# Patient Record
Sex: MALE | Race: OTHER | Employment: STUDENT | URBAN - METROPOLITAN AREA
[De-identification: names, ages, dates, MRNs, and addresses within clinical notes are randomized per-mention and may not be internally consistent; named-entity substitution may affect disease eponyms.]

---
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
## 2025-01-09 ENCOUNTER — HOSPITAL ENCOUNTER (OUTPATIENT)
Dept: RADIOLOGY | Facility: HOSPITAL | Age: 20
Discharge: HOME | End: 2025-01-09
Payer: COMMERCIAL

## 2025-01-09 ENCOUNTER — APPOINTMENT (OUTPATIENT)
Dept: ORTHOPEDIC SURGERY | Facility: CLINIC | Age: 20
End: 2025-01-09
Payer: COMMERCIAL

## 2025-01-09 VITALS — HEIGHT: 66 IN | BODY MASS INDEX: 22.5 KG/M2 | WEIGHT: 140 LBS

## 2025-01-09 DIAGNOSIS — D16.22 OSTEOCHONDROMA OF FEMUR, LEFT: Primary | ICD-10-CM

## 2025-01-09 DIAGNOSIS — M25.562 LEFT KNEE PAIN, UNSPECIFIED CHRONICITY: ICD-10-CM

## 2025-01-09 PROCEDURE — 73564 X-RAY EXAM KNEE 4 OR MORE: CPT | Mod: LT

## 2025-01-09 PROCEDURE — 99203 OFFICE O/P NEW LOW 30 MIN: CPT | Performed by: STUDENT IN AN ORGANIZED HEALTH CARE EDUCATION/TRAINING PROGRAM

## 2025-01-09 PROCEDURE — 3008F BODY MASS INDEX DOCD: CPT | Performed by: STUDENT IN AN ORGANIZED HEALTH CARE EDUCATION/TRAINING PROGRAM

## 2025-01-09 RX ORDER — IBUPROFEN 800 MG/1
1 TABLET ORAL 2 TIMES DAILY PRN
COMMUNITY
Start: 2024-11-05

## 2025-01-09 RX ORDER — SERTRALINE HYDROCHLORIDE 50 MG/1
50 TABLET, FILM COATED ORAL DAILY
COMMUNITY

## 2025-01-09 NOTE — PROGRESS NOTES
Chief Complaint   Patient presents with    Left Knee - Pain     Xray's today       HPI  19-year-old male presents today for evaluation of his left knee.  Endorses sharp lateral knee pain clicking catching popping has been present for quite some time also endorses a enlarging mass about the anterior lateral aspect of his knee there this pain seems to be emanating from.  No history of trauma.  Notices that he has pain particular worsening when going up and down stairs.  Presents today for orthopedic evaluation treatment    Past Medical History:   Diagnosis Date    Personal history of other diseases of the respiratory system     Personal history of asthma       Past Surgical History:   Procedure Laterality Date    CIRCUMCISION, PRIMARY  04/24/2014    Elective Circumcision        No Known Allergies     Physical exam    General: Alert and oriented to place, person, and time.  No acute distress and breathing comfortably; pleasant and cooperative with the examination.  HEENT: Head is normocephalic and atraumatic.  Neck: Supple, no visible swelling.  Cardiovascular: Good perfusion to the affected extremity.  Lungs: No audible wheezing or labored breathing.  Abdomen: Nondistended  HEME/Lymph : No visible abnormalities bilateral lower extremity    Extremity:  Left knee:  Skin healthy and intact  Swelling about the anterolateral aspect the knee palpable nodule about the anterolateral aspect of the knee this is firm fixed mild tenderness outpatient with some mechanical crepitus with range of motion the knee  No significant varus or valgus malalignment  Effusion: Mild     ROM:  Full flexion   Full extension  No pain with internal rotation of the hip  Tenderness to palpation: Medial and lateral joint line     No laxity to valgus stress  No laxity to varus stress  Negative Lachman´s test  Negative anterior drawer test  Negative posterior drawer test  Positive Azalia´s test     Neurovascular exam normal  distally    Diagnostics:  X-rays collected in clinic today about the left knee: There is a mass about the distal femur consistent with an osteochondroma about the lateral metaphysis.  No aggressive appearance.  Maintained joint space within the knee articulation    Procedure:  Procedures    Assessment:  19-year-old male with osteochondroma left knee    Treatment plan:  The natural history of the condition and its associated treatment alternatives including surgical and nonsurgical options were discussed with the patient at length.  Given enlarging mass and mechanical type symptoms I do recommend proceed with an MRI of the left knee  The history and clinical exam are consistent with intraarticular pathology and therefore MRI is medically indicated to evaluate the soft tissues of the joint. Follow up in one week or after completion of the MRI to advance management accordingly  The patient understands and agrees with the plan.  Discussed activities to avoid as well as importance of using pain as a guide  All of the patient's questions were answered.

## 2025-07-14 ENCOUNTER — HOSPITAL ENCOUNTER (INPATIENT)
Age: 20
LOS: 4 days | Discharge: HOME | DRG: 751 | End: 2025-07-18
Attending: PSYCHIATRY & NEUROLOGY | Admitting: PSYCHIATRY & NEUROLOGY
Payer: COMMERCIAL

## 2025-07-14 DIAGNOSIS — F33.2 SEVERE EPISODE OF RECURRENT MAJOR DEPRESSIVE DISORDER, WITHOUT PSYCHOTIC FEATURES (HCC): Primary | ICD-10-CM

## 2025-07-14 DIAGNOSIS — F32.89 OTHER DEPRESSION: ICD-10-CM

## 2025-07-14 DIAGNOSIS — R45.851 SUICIDAL IDEATION: ICD-10-CM

## 2025-07-14 PROBLEM — F32.A DEPRESSION WITH SUICIDAL IDEATION: Status: ACTIVE | Noted: 2025-07-14

## 2025-07-14 LAB
ALBUMIN SERPL-MCNC: 5 G/DL (ref 3.5–4.6)
ALP SERPL-CCNC: 55 U/L (ref 35–104)
ALT SERPL-CCNC: 11 U/L (ref 0–41)
AMPHET UR QL SCN: ABNORMAL
ANION GAP SERPL CALCULATED.3IONS-SCNC: 13 MEQ/L (ref 9–15)
APAP SERPL-MCNC: <5 UG/ML (ref 10–30)
AST SERPL-CCNC: 16 U/L (ref 0–40)
BACTERIA URNS QL MICRO: NEGATIVE /HPF
BARBITURATES UR QL SCN: ABNORMAL
BASOPHILS # BLD: 0 K/UL (ref 0–0.2)
BASOPHILS NFR BLD: 0.4 %
BENZODIAZ UR QL SCN: ABNORMAL
BILIRUB SERPL-MCNC: 1 MG/DL (ref 0.2–0.7)
BILIRUB UR QL STRIP: NEGATIVE
BUN SERPL-MCNC: 10 MG/DL (ref 6–20)
CALCIUM SERPL-MCNC: 9.1 MG/DL (ref 8.5–9.9)
CANNABINOIDS UR QL SCN: POSITIVE
CHLORIDE SERPL-SCNC: 105 MEQ/L (ref 95–107)
CHOLEST SERPL-MCNC: 174 MG/DL (ref 0–199)
CK SERPL-CCNC: 126 U/L (ref 0–190)
CLARITY UR: CLEAR
CO2 SERPL-SCNC: 26 MEQ/L (ref 20–31)
COCAINE UR QL SCN: ABNORMAL
COLOR UR: YELLOW
CREAT SERPL-MCNC: 0.75 MG/DL (ref 0.7–1.2)
DRUG SCREEN COMMENT UR-IMP: ABNORMAL
EKG ATRIAL RATE: 68 BPM
EKG DIAGNOSIS: NORMAL
EKG P AXIS: 62 DEGREES
EKG P-R INTERVAL: 148 MS
EKG Q-T INTERVAL: 372 MS
EKG QRS DURATION: 96 MS
EKG QTC CALCULATION (BAZETT): 395 MS
EKG R AXIS: 80 DEGREES
EKG T AXIS: 42 DEGREES
EKG VENTRICULAR RATE: 68 BPM
EOSINOPHIL # BLD: 0.1 K/UL (ref 0–0.7)
EOSINOPHIL NFR BLD: 0.9 %
EPI CELLS #/AREA URNS AUTO: ABNORMAL /HPF (ref 0–5)
ERYTHROCYTE [DISTWIDTH] IN BLOOD BY AUTOMATED COUNT: 11.5 % (ref 11.5–14.5)
ESTIMATED AVERAGE GLUCOSE: 85 MG/DL
ETHANOL PERCENT: 0.03 G/DL
ETHANOLAMINE SERPL-MCNC: 29 MG/DL (ref 0–0.08)
FENTANYL SCREEN, URINE: ABNORMAL
GLOBULIN SER CALC-MCNC: 2.5 G/DL (ref 2.3–3.5)
GLUCOSE SERPL-MCNC: 103 MG/DL (ref 70–99)
GLUCOSE UR STRIP-MCNC: NEGATIVE MG/DL
HBA1C MFR BLD: 4.6 % (ref 4–6)
HCT VFR BLD AUTO: 45.3 % (ref 42–52)
HDLC SERPL-MCNC: 66 MG/DL (ref 40–59)
HGB BLD-MCNC: 16.1 G/DL (ref 14–18)
HGB UR QL STRIP: NEGATIVE
HYALINE CASTS #/AREA URNS AUTO: ABNORMAL /HPF (ref 0–5)
KETONES UR STRIP-MCNC: ABNORMAL MG/DL
LDLC SERPL CALC-MCNC: 89 MG/DL (ref 0–129)
LEUKOCYTE ESTERASE UR QL STRIP: ABNORMAL
LYMPHOCYTES # BLD: 1.6 K/UL (ref 1–4.8)
LYMPHOCYTES NFR BLD: 28.7 %
MAGNESIUM SERPL-MCNC: 2.2 MG/DL (ref 1.7–2.4)
MCH RBC QN AUTO: 32.7 PG (ref 27–31.3)
MCHC RBC AUTO-ENTMCNC: 35.5 % (ref 33–37)
MCV RBC AUTO: 92.1 FL (ref 79–92.2)
METHADONE UR QL SCN: ABNORMAL
MONOCYTES # BLD: 0.5 K/UL (ref 0.2–0.8)
MONOCYTES NFR BLD: 8.2 %
NEUTROPHILS # BLD: 3.4 K/UL (ref 1.4–6.5)
NEUTS SEG NFR BLD: 61.3 %
NITRITE UR QL STRIP: NEGATIVE
OPIATES UR QL SCN: ABNORMAL
OXYCODONE UR QL SCN: ABNORMAL
PCP UR QL SCN: ABNORMAL
PH UR STRIP: 6 [PH] (ref 5–9)
PLATELET # BLD AUTO: 164 K/UL (ref 130–400)
POTASSIUM SERPL-SCNC: 4.2 MEQ/L (ref 3.4–4.9)
PROPOXYPH UR QL SCN: ABNORMAL
PROT SERPL-MCNC: 7.5 G/DL (ref 6.3–8)
PROT UR STRIP-MCNC: NEGATIVE MG/DL
RBC # BLD AUTO: 4.92 M/UL (ref 4.7–6.1)
RBC #/AREA URNS AUTO: ABNORMAL /HPF (ref 0–5)
SALICYLATES SERPL-MCNC: <0.3 MG/DL (ref 15–30)
SODIUM SERPL-SCNC: 144 MEQ/L (ref 135–144)
SP GR UR STRIP: 1.01 (ref 1–1.03)
TRIGL SERPL-MCNC: 97 MG/DL (ref 0–150)
TSH REFLEX: 3.29 UIU/ML (ref 0.44–3.86)
URINE REFLEX TO CULTURE: YES
UROBILINOGEN UR STRIP-ACNC: 1 E.U./DL
WBC # BLD AUTO: 5.6 K/UL (ref 4.5–11)
WBC #/AREA URNS AUTO: ABNORMAL /HPF (ref 0–5)

## 2025-07-14 PROCEDURE — 80053 COMPREHEN METABOLIC PANEL: CPT

## 2025-07-14 PROCEDURE — 82077 ASSAY SPEC XCP UR&BREATH IA: CPT

## 2025-07-14 PROCEDURE — 80061 LIPID PANEL: CPT

## 2025-07-14 PROCEDURE — 93005 ELECTROCARDIOGRAM TRACING: CPT

## 2025-07-14 PROCEDURE — 83735 ASSAY OF MAGNESIUM: CPT

## 2025-07-14 PROCEDURE — 82550 ASSAY OF CK (CPK): CPT

## 2025-07-14 PROCEDURE — 99285 EMERGENCY DEPT VISIT HI MDM: CPT

## 2025-07-14 PROCEDURE — 93010 ELECTROCARDIOGRAM REPORT: CPT | Performed by: INTERNAL MEDICINE

## 2025-07-14 PROCEDURE — 80307 DRUG TEST PRSMV CHEM ANLYZR: CPT

## 2025-07-14 PROCEDURE — 81001 URINALYSIS AUTO W/SCOPE: CPT

## 2025-07-14 PROCEDURE — 36415 COLL VENOUS BLD VENIPUNCTURE: CPT

## 2025-07-14 PROCEDURE — 6370000000 HC RX 637 (ALT 250 FOR IP): Performed by: PSYCHIATRY & NEUROLOGY

## 2025-07-14 PROCEDURE — 87661 TRICHOMONAS VAGINALIS AMPLIF: CPT

## 2025-07-14 PROCEDURE — 80143 DRUG ASSAY ACETAMINOPHEN: CPT

## 2025-07-14 PROCEDURE — 84443 ASSAY THYROID STIM HORMONE: CPT

## 2025-07-14 PROCEDURE — 85025 COMPLETE CBC W/AUTO DIFF WBC: CPT

## 2025-07-14 PROCEDURE — 87591 N.GONORRHOEAE DNA AMP PROB: CPT

## 2025-07-14 PROCEDURE — 1240000000 HC EMOTIONAL WELLNESS R&B

## 2025-07-14 PROCEDURE — 80179 DRUG ASSAY SALICYLATE: CPT

## 2025-07-14 PROCEDURE — 87086 URINE CULTURE/COLONY COUNT: CPT

## 2025-07-14 PROCEDURE — 83036 HEMOGLOBIN GLYCOSYLATED A1C: CPT

## 2025-07-14 PROCEDURE — 87491 CHLMYD TRACH DNA AMP PROBE: CPT

## 2025-07-14 RX ORDER — HALOPERIDOL 5 MG/1
5 TABLET ORAL EVERY 6 HOURS PRN
Status: DISCONTINUED | OUTPATIENT
Start: 2025-07-14 | End: 2025-07-18 | Stop reason: HOSPADM

## 2025-07-14 RX ORDER — HALOPERIDOL 5 MG/ML
5 INJECTION INTRAMUSCULAR EVERY 6 HOURS PRN
Status: DISCONTINUED | OUTPATIENT
Start: 2025-07-14 | End: 2025-07-18 | Stop reason: HOSPADM

## 2025-07-14 RX ORDER — HYDROXYZINE PAMOATE 50 MG/1
50 CAPSULE ORAL EVERY 6 HOURS PRN
Status: DISCONTINUED | OUTPATIENT
Start: 2025-07-14 | End: 2025-07-18 | Stop reason: HOSPADM

## 2025-07-14 RX ORDER — POLYETHYLENE GLYCOL 3350 17 G
2 POWDER IN PACKET (EA) ORAL
Status: DISCONTINUED | OUTPATIENT
Start: 2025-07-14 | End: 2025-07-18 | Stop reason: HOSPADM

## 2025-07-14 RX ORDER — CLONIDINE HYDROCHLORIDE 0.1 MG/1
0.4 TABLET ORAL NIGHTLY
Status: DISCONTINUED | OUTPATIENT
Start: 2025-07-14 | End: 2025-07-15

## 2025-07-14 RX ORDER — HYDROXYZINE HYDROCHLORIDE 50 MG/ML
50 INJECTION, SOLUTION INTRAMUSCULAR EVERY 6 HOURS PRN
Status: DISCONTINUED | OUTPATIENT
Start: 2025-07-14 | End: 2025-07-18 | Stop reason: HOSPADM

## 2025-07-14 RX ORDER — TRAZODONE HYDROCHLORIDE 50 MG/1
50 TABLET ORAL NIGHTLY PRN
Status: DISCONTINUED | OUTPATIENT
Start: 2025-07-14 | End: 2025-07-15

## 2025-07-14 RX ORDER — ACETAMINOPHEN 325 MG/1
650 TABLET ORAL EVERY 4 HOURS PRN
Status: DISCONTINUED | OUTPATIENT
Start: 2025-07-14 | End: 2025-07-18 | Stop reason: HOSPADM

## 2025-07-14 RX ORDER — MAGNESIUM HYDROXIDE/ALUMINUM HYDROXICE/SIMETHICONE 120; 1200; 1200 MG/30ML; MG/30ML; MG/30ML
30 SUSPENSION ORAL PRN
Status: DISCONTINUED | OUTPATIENT
Start: 2025-07-14 | End: 2025-07-18 | Stop reason: HOSPADM

## 2025-07-14 RX ORDER — BENZTROPINE MESYLATE 1 MG/ML
2 INJECTION, SOLUTION INTRAMUSCULAR; INTRAVENOUS 2 TIMES DAILY PRN
Status: DISCONTINUED | OUTPATIENT
Start: 2025-07-14 | End: 2025-07-18 | Stop reason: HOSPADM

## 2025-07-14 RX ADMIN — CLONIDINE HYDROCHLORIDE 0.4 MG: 0.1 TABLET ORAL at 21:55

## 2025-07-14 RX ADMIN — SERTRALINE 50 MG: 50 TABLET, FILM COATED ORAL at 21:55

## 2025-07-14 ASSESSMENT — PATIENT HEALTH QUESTIONNAIRE - PHQ9
SUM OF ALL RESPONSES TO PHQ QUESTIONS 1-9: 2
2. FEELING DOWN, DEPRESSED OR HOPELESS: SEVERAL DAYS
1. LITTLE INTEREST OR PLEASURE IN DOING THINGS: SEVERAL DAYS
SUM OF ALL RESPONSES TO PHQ QUESTIONS 1-9: 2

## 2025-07-14 ASSESSMENT — LIFESTYLE VARIABLES
HOW OFTEN DO YOU HAVE A DRINK CONTAINING ALCOHOL: 4 OR MORE TIMES A WEEK
HOW OFTEN DO YOU HAVE A DRINK CONTAINING ALCOHOL: 4 OR MORE TIMES A WEEK
HOW MANY STANDARD DRINKS CONTAINING ALCOHOL DO YOU HAVE ON A TYPICAL DAY: 3 OR 4
HOW MANY STANDARD DRINKS CONTAINING ALCOHOL DO YOU HAVE ON A TYPICAL DAY: 1 OR 2

## 2025-07-14 ASSESSMENT — SLEEP AND FATIGUE QUESTIONNAIRES
AVERAGE NUMBER OF SLEEP HOURS: 7
DO YOU HAVE DIFFICULTY SLEEPING: YES
DO YOU USE A SLEEP AID: YES
SLEEP PATTERN: DISTURBED/INTERRUPTED SLEEP

## 2025-07-14 ASSESSMENT — PAIN - FUNCTIONAL ASSESSMENT: PAIN_FUNCTIONAL_ASSESSMENT: NONE - DENIES PAIN

## 2025-07-14 NOTE — VIRTUAL HEALTH
Knowledge: adequate      Risk Assessment:  C-SSRS Score       7/14/2025     6:33 AM   C-SSRS Suicide Screening   1) Within the past month, have you wished you were dead or wished you could go to sleep and not wake up?  Yes   2) Have you actually had any thoughts of killing yourself?  Yes   3) Have you been thinking about how you might kill yourself?  Yes   4) Have you had these thoughts and had some intention of acting on them?  Yes   5) Have you started to work out or worked out the details of how to kill yourself? Do you intend to carry out this plan?  Yes   6) Have you ever done anything, started to do anything, or prepared to do anything to end your life? Yes   Did this occur within the past 3 months?  No    :    Protective Factors:  Protective: Does not have access to guns, No active substance abuse, Patient has social or family support, and No active psychosis or cognitive dysfunction  Risk Factors:  Risk Factors: Age <25 or >55, Male gender, Depressed mood, Active suicidal ideation, Suicide plan, Prior suicide attempt, No outpatient services in place, Medication noncompliance, No collateral information to support safety, and Self- injurious/ Self-harm behavior      Overall Level Suicide Risk: [facility] CSSRS Risk Level: High Risk    Assessment:   Patient is a 20 y.o.  male who presents for psychiatric evaluation.  Assessment patient is alert and oriented as well as calm and cooperative.  Patient endorses having suicidal ideations, poor sleep, anhedonia, feelings of worthlessness, lack of motivation, hopelessness, and varied appetite.  Patient denies homicidal ideations and denies any symptoms of psychosis including auditory/visual hallucinations, or paranoia.  Patient endorses utilizing Zoloft however has not been on it for a month.  Patient reports not following with any outpatient psychiatric provider.  He also reports feeling as if he is not able to discuss his mental state with the people he

## 2025-07-14 NOTE — ED NOTES
Caller's Name:Remington     Bed Number:on arrival     Level of Care:     Report Number:[phone]     Accepting MD (Full name):Shiela Cano     [facility]     Address to accepting facility:[facility]

## 2025-07-14 NOTE — ED NOTES
Patient to NURIS.  Changed to NURIS attire.  Belongings secured, logged, and placed in locker.  Contraband check is negative.  Urine specimen obtained and sent to lab.  Phlebotomist called for blood draw.

## 2025-07-14 NOTE — ED TRIAGE NOTES
Pt arrived to triage via EMS.  Pt c/o suicidal ideations.  Pt states [place] but [place] to [place] 911 instead.   Pt calm and cooperative in triage.

## 2025-07-14 NOTE — ED PROVIDER NOTES
[facility] EMERGENCY DEPARTMENT  EMERGENCY DEPARTMENT ENCOUNTER      Pt Name: Mike Parsons  MRN: [id number]  Birthdate 2005  Date of evaluation: 7/14/2025  Provider: Kristian Laguna PA-C  Note Started: 7/14/25 2:28 AM EDT    CHIEF COMPLAINT       Chief Complaint   Patient presents with    Suicidal         HISTORY OF PRESENT ILLNESS   (Location/Symptom, Timing/Onset, Context/Setting, Quality, Duration, Modifying Factors, Severity)  Note limiting factors.   Mike Parsons is a 20 y.o. male who presents to the emergency department for suicidal ideation.  Patient does have history of depression and takes Zoloft daily.  Patient states he also experiences anxiety.  Patient states that he has been overwhelmed with work and life.  Patient states that he had a plan to jump off [address].  Denying self-harm, hallucinations, HI.  Patient has no other complaints.  Denies fever, chills, flulike symptoms, abdominal pain, chest pain, shortness of breath.    HPI    Nursing Notes were reviewed.    REVIEW OF SYSTEMS    (2-9 systems for level 4, 10 or more for level 5)     Review of Systems   Constitutional:  Negative for chills and fever.   Respiratory:  Negative for shortness of breath.    Cardiovascular:  Negative for chest pain.   Gastrointestinal:  Negative for abdominal pain.   Skin:  Negative for rash.   Psychiatric/Behavioral:  Positive for suicidal ideas. The patient is nervous/anxious.        Except as noted above the remainder of the review of systems was reviewed and negative.       PAST MEDICAL HISTORY     Past Medical History:   Diagnosis Date    Asthma     Attention deficit disorder (ADD)     [place]wood    Autism spectrum disorder          SURGICAL HISTORY     No past surgical history on file.      CURRENT MEDICATIONS       Previous Medications    ALBUTEROL (PROVENTIL) (2.5 MG/3ML) 0.083% NEBULIZER SOLUTION    Take 3 mLs by nebulization 4 times daily as needed for Wheezing    ALBUTEROL 2 MG/5 ML

## 2025-07-15 LAB — BACTERIA UR CULT: NORMAL

## 2025-07-15 PROCEDURE — 6370000000 HC RX 637 (ALT 250 FOR IP): Performed by: PSYCHIATRY & NEUROLOGY

## 2025-07-15 PROCEDURE — 1240000000 HC EMOTIONAL WELLNESS R&B

## 2025-07-15 RX ORDER — TRAZODONE HYDROCHLORIDE 50 MG/1
50 TABLET ORAL NIGHTLY
Status: DISCONTINUED | OUTPATIENT
Start: 2025-07-15 | End: 2025-07-18 | Stop reason: HOSPADM

## 2025-07-15 RX ORDER — ALBUTEROL SULFATE 90 UG/1
2 INHALANT RESPIRATORY (INHALATION) EVERY 4 HOURS PRN
Status: DISCONTINUED | OUTPATIENT
Start: 2025-07-15 | End: 2025-07-18 | Stop reason: HOSPADM

## 2025-07-15 RX ADMIN — TRAZODONE HYDROCHLORIDE 50 MG: 50 TABLET ORAL at 21:48

## 2025-07-15 RX ADMIN — SERTRALINE 50 MG: 50 TABLET, FILM COATED ORAL at 08:42

## 2025-07-15 RX ADMIN — ACETAMINOPHEN 650 MG: 325 TABLET ORAL at 08:49

## 2025-07-15 ASSESSMENT — PATIENT HEALTH QUESTIONNAIRE - PHQ9
8. MOVING OR SPEAKING SO SLOWLY THAT OTHER PEOPLE COULD HAVE NOTICED. OR THE OPPOSITE, BEING SO FIGETY OR RESTLESS THAT YOU HAVE BEEN MOVING AROUND A LOT MORE THAN USUAL: NEARLY EVERY DAY
2. FEELING DOWN, DEPRESSED OR HOPELESS: MORE THAN HALF THE DAYS
4. FEELING TIRED OR HAVING LITTLE ENERGY: NEARLY EVERY DAY
SUM OF ALL RESPONSES TO PHQ QUESTIONS 1-9: 23
9. THOUGHTS THAT YOU WOULD BE BETTER OFF DEAD, OR OF HURTING YOURSELF: NEARLY EVERY DAY
SUM OF ALL RESPONSES TO PHQ QUESTIONS 1-9: 20
SUM OF ALL RESPONSES TO PHQ QUESTIONS 1-9: 23
1. LITTLE INTEREST OR PLEASURE IN DOING THINGS: MORE THAN HALF THE DAYS
7. TROUBLE CONCENTRATING ON THINGS, SUCH AS READING THE NEWSPAPER OR WATCHING TELEVISION: SEVERAL DAYS
6. FEELING BAD ABOUT YOURSELF - OR THAT YOU ARE A FAILURE OR HAVE LET YOURSELF OR YOUR FAMILY DOWN: NEARLY EVERY DAY
3. TROUBLE FALLING OR STAYING ASLEEP: NEARLY EVERY DAY
SUM OF ALL RESPONSES TO PHQ QUESTIONS 1-9: 23
5. POOR APPETITE OR OVEREATING: NEARLY EVERY DAY

## 2025-07-15 ASSESSMENT — LIFESTYLE VARIABLES
HOW OFTEN DO YOU HAVE A DRINK CONTAINING ALCOHOL: 2-3 TIMES A WEEK
HOW MANY STANDARD DRINKS CONTAINING ALCOHOL DO YOU HAVE ON A TYPICAL DAY: 1 OR 2

## 2025-07-15 ASSESSMENT — PAIN SCALES - GENERAL: PAINLEVEL_OUTOF10: 5

## 2025-07-15 NOTE — GROUP NOTE
Patient did not attend group.    Date: 7/15/2025    Group Start Time: 0945  Group End Time: 1045  Group Topic: Music Therapy    [facility] Pia Olivo    Musical Timeline  Art Component/Reflection, Music Analysis/Discussion, Playlist Building/Song Share, Receptive Music Listening    Patients will identify songs related to significant moments and place them on a \"timeline\" that is representative of their life. Patients will select songs to hear in group and be encouraged to explain their connection to the music. Patients will reflect on how their music listening and/or how they make meaning from music has changed over time.    Focus: Creativity, Identity Development, Processing  Goals: Improve Self-Awareness, Improve Mood, Improve Self-Expression, Improve Coping Skills, Promote Reality Orientation     Signature: Pia Rasheed Licensed Professional Music Therapist (LPMT)

## 2025-07-15 NOTE — CARE COORDINATION
Leisure Assessment  July 15, 2025 /  1225  pm    Appearance: Alert, Appears as stated age, and Pleasant  Current Mental Status: Calm and Cooperative  Affect/Mood: Congruent/ Euthymic  Thought Content/Processes: Linear  Insight/Judgement: Fair insight and Fair judgment  Speech: spontaneous, normal rate, and normal volume  Delusions/Hallucinations: none observed/reported   Admit Status:  Involuntary     Patient identified his leisure interests are playing video games, reading, and spending time doing outdoor activities (fishing, camping, etc.). Patient expressed that he prefers to spend his time alone. Patient explained that he has some social support, but does not feel comfortable sharing his emotions with others d/t \"not wanting them to worry about me.\" Patient reported that he had SI/was going to harm himself, but changed his mind and called 911. Patient presented as future-focused AEB discussing desire to restart medications and engage with counseling services. Patient identified that \"my family gives me strength.\"    Recommendations: Decrease Impulsivity/Impulsive Behaviors, Increase Socialization, Improve/Maintain Coping Skills Development, Improve/Maintain Emotion Regulation Skills/Mood, Improve/Maintain Insight/Self-Awareness, and Improve/Maintain Participation in Healthy Leisure Interests    Signature: Pia Rasheed, Licensed Professional Music Therapist (LPMT)   Patient Contact    Attempt # 1    Was call answered?  No.  Left message on voicemail with information to call back for clarification of request/question.     Catalina Mora RN

## 2025-07-15 NOTE — CONSULTS
Consult Note            Date:7/15/2025        Patient Name:Mike Parsons     YOB: 2005     Age:20 y.o.    Reason for Consult: Psych H&P    Chief Complaint     Chief Complaint   Patient presents with    Suicidal          History Obtained From   patient, electronic medical record    History of Present Illness   Patient is a 20-year-old male with a past medical history of asthma ADD, autism,  GERD's admitted for mental health evaluation for thoughts of suicide ideation.  Patient reported feeling overwhelmed at work and with his life.  [place]    Patient denies chest pain, palpitations, lightheadedness, headache, dizziness, shortness of breath, cough, N/V/D, and changes in appetite.  Patient also denies smoking, illicit drug, and alcohol use.  Denies self-inflicted injuries or wounds.   Hospitalist consulted for evaluation and recommendations for acute and chronic disease management while receiving inpatient psych services.            Past Medical History     Past Medical History:   Diagnosis Date    Asthma     Attention deficit disorder (ADD)     [place]    Autism spectrum disorder         Past Surgical History   No past surgical history on file.     Medications     Prior to Admission medications    Medication Sig Start Date End Date Taking? Authorizing Provider   sertraline (ZOLOFT) 50 MG tablet Take 1 tablet by mouth daily 11/5/24  Yes Ankush Dueñas MD   cloNIDine (CATAPRES) 0.2 MG tablet Take 2 tablets by mouth nightly 9/16/24  Yes Ankush Dueñas MD   cloNIDine (CATAPRES) 0.2 MG tablet TAKE 2 TABLETS BY MOUTH EVERY DAY AT NIGHT 11/5/24   Ankush Dueñas MD   albuterol sulfate HFA (PROVENTIL;VENTOLIN;PROAIR) 108 (90 Base) MCG/ACT inhaler TAKE 2 PUFFS BY MOUTH EVERY 6 HOURS AS NEEDED FOR WHEEZE 11/5/24   Ankush Dueñas MD   albuterol sulfate HFA (PROVENTIL;VENTOLIN;PROAIR) 108 (90 Base) MCG/ACT inhaler Inhale 2 puffs into the lungs every 6 hours as needed

## 2025-07-15 NOTE — PLAN OF CARE
Patient admission assessment complete.  Patient denies current SI, Hi, and hallucinations. States he feels safe on the unit and contracts for safety.  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  He is currently working on getting his GED.  He lives with his step-mother, father not in the picture. Mother not in the picture-had HIV. Patient has 5-6 siblings, unsure how many when asked.  Denies abuse history and no violent arrests, though he has been previously arrested.  Patient is here because he was overwhelmed with work and life and had a plan to jump off of the [place] bridge in an attempt to kill himself. Patient takes zoloft and is medication compliant, he is just experiencing increased anxiety lately. He is admitted for SI with a plan, increased depression.       Problem: Self Harm/Suicidality  Goal: Will have no self-injury during hospital stay  Description: INTERVENTIONS:  1.  Ensure constant observer at bedside with Q15M safety checks  2.  Maintain a safe environment  3.  Secure patient belongings  4.  Ensure family/visitors adhere to safety recommendations  5.  Ensure safety tray has been added to patient's diet order  6.  Every shift and PRN: Re-assess suicidal risk via Frequent Screener    Outcome: Not Progressing  Flowsheets (Taken 7/14/2025 2219)  Will have no self-injury during hospital stay:   Ensure constant observer at bedside with Q15M safety checks   Maintain a safe environment   Secure patient belongings   Ensure safety tray has been added to patient's diet order   Ensure family/visitors adhere to safety recommendations   Every shift and PRN: Re-assess suicidal risk via Frequent Screener     Problem: Risk for Elopement  Goal: Patient will not exit the unit/facility without proper excort  Outcome: Not Progressing  Flowsheets  Taken 7/14/2025 2219  Nursing Interventions for Elopement Risk:   Assist with personal care needs such as toileting, eating, dressing, as needed to reduce

## 2025-07-15 NOTE — H&P
[facility] - Department of Psychiatry    History and Physical - Adult         CHIEF COMPLAINT:  Depression SI    History obtained from:  patient    Patient was seen after discussing with the treatment team and reviewing the chart        CIRCUMSTANCES OF ADMISSION:     Patient is a 20 y.o.  male who presents for psychiatric evaluation. Patient presented to the ED on 07/14/25 from police.  Per ED documentation: \"Pt arrived to triage via EMS. Pt c/o suicidal ideations.Pt states [place] but decided to [place] 911 instead. Pt calm and cooperative in triage.\"     Patient reports presenting to the ED due to having been having a lot of racing thoughts and looking back at his life and feeling as if things are the way that they were supposed to.      Patient denies following with any outpatient psychiatric provider or therapist.  He states that he had been prescribed Zoloft however has not taken it in about a month.  He does share that he feels as if he had improved mood while on it and had just forgotten to pick it up but had been on it for approximately 6 months prior.  He states that ultimately he would like to be happy and more and not feel as if he constantly has to take a mental reset throughout the day.  Patient identifies his niece and nephew as protective factors as well as having interest and desire to do better in his life.  However he does endorse often feelings of hopelessness as if things would not get better and it is constantly doing the same thing daily with no change.  He reports impact and feelings of worth and states that he has low self-esteem and feelings of worthlessness.  He reports previously having interest in activities such as video games and fishing however states that he has not had any motivation to engage in these activities as of lately.  Patient reports having difficulty with his sleep and often not sleeping well.  He notes that he

## 2025-07-15 NOTE — CARE COORDINATION
Psychosocial Assessment     Admission Reason: Per ER Note: \"Chief Complaint:  “Suicidal ideations plan to jump off bridge”  HPI: Patient is a 20 y.o.  male who presents for psychiatric evaluation. Patient presented to the ED on 07/14/25 from police.  Per ED documentation: \"Pt arrived to triage via EMS. Pt c/o suicidal ideations.Pt states he [place] but decided to call 911 instead. Pt calm and cooperative in triage.\"    C-SSRS Lifetime Recent Completed - Current Suicide Risk:   [] No Risk  [] Low [] Moderate [x] High     Risk Factors:     Does not have access to guns, No active substance abuse, Patient has social or family support, and No active psychosis or cognitive dysfunction   Protective Factors:   Age <25 or >55, Male gender, Depressed mood, Active suicidal ideation, Suicide plan, Prior suicide attempt, No outpatient services in place, Medication noncompliance, No collateral information to support safety, and Self- injurious/ Self-harm behavior     Gender:  [x] Male [] Female [] Transgender  [] Other    Sexual Orientation:  [x] Heterosexual [] Homosexual [] Bisexual [] Other    Current or Past Mental Health and/or Addictions Treatment (and response to treatment):  [] Yes, When and Where:   [x] No    Substance Use/Alcohol Use/Addiction (document name of substance, age of onset, how much and how often, route of use and date of last use):  [x] Reports   Substance: alcohol usage 2-3x weekly 2-3 drinks, THC daily.    [x] Patient was provided a listing of community sobriety resources on admission.    [] Denies    AUDIT: 4  DAST: 1  PHQ 9: 23    Sobriety Education provided:  [] Yes  [] No  [] N/A [x] Refused    Learners: Patient [x]  Family []  Significant Other  [] Caregiver [] Other []   Readiness: Eager []   Acceptance  []   Nonacceptances []   Refused [x]   Method: Explanation []   Handout []   Response: Verbalizes Understanding []   No evidence of Learning []   Refuses

## 2025-07-16 LAB
SPECIMEN SOURCE: NORMAL
TRICHOMONAS VAGINALIS, MOLECULAR: NEGATIVE

## 2025-07-16 PROCEDURE — 6370000000 HC RX 637 (ALT 250 FOR IP): Performed by: PSYCHIATRY & NEUROLOGY

## 2025-07-16 PROCEDURE — 1240000000 HC EMOTIONAL WELLNESS R&B

## 2025-07-16 RX ADMIN — TRAZODONE HYDROCHLORIDE 50 MG: 50 TABLET ORAL at 21:21

## 2025-07-16 RX ADMIN — SERTRALINE 50 MG: 50 TABLET, FILM COATED ORAL at 08:47

## 2025-07-16 RX ADMIN — ACETAMINOPHEN 650 MG: 325 TABLET ORAL at 21:23

## 2025-07-16 ASSESSMENT — PAIN DESCRIPTION - LOCATION: LOCATION: BACK

## 2025-07-16 ASSESSMENT — PAIN SCALES - GENERAL: PAINLEVEL_OUTOF10: 6

## 2025-07-16 ASSESSMENT — PAIN DESCRIPTION - DESCRIPTORS: DESCRIPTORS: ACHING

## 2025-07-16 NOTE — CARE COORDINATION
FAMILY COLLATERAL NOTE    Family/Support Name: Evan Salazar   Contact #:[phone]   Relationship to Pt:: Step Mother    LSW attempted to contact above for collateral information. HIPAA compliant voicemail left for return call.    LOIDA Thakkar

## 2025-07-16 NOTE — PLAN OF CARE
Problem: Self Harm/Suicidality  Goal: Will have no self-injury during hospital stay  Description: INTERVENTIONS:  1.  Ensure constant observer at bedside with Q15M safety checks  2.  Maintain a safe environment  3.  Secure patient belongings  4.  Ensure family/visitors adhere to safety recommendations  5.  Ensure safety tray has been added to patient's diet order  6.  Every shift and PRN: Re-assess suicidal risk via Frequent Screener    7/16/2025 0134 by Megha Blanca RN  Outcome: Progressing  Flowsheets (Taken 7/15/2025 1141 by Savanna Allen, RN)  Will have no self-injury during hospital stay: Maintain a safe environment     Problem: Risk for Elopement  Goal: Patient will not exit the unit/facility without proper excort  7/16/2025 0134 by Megha Blanca RN  Outcome: Progressing  Flowsheets (Taken 7/15/2025 1141 by Savanna Allen, RN)  Nursing Interventions for Elopement Risk: Reduce environmental triggers     Problem: Depression  Goal: Will be euthymic at discharge  Description: INTERVENTIONS:  1. Administer medication as ordered  2. Provide emotional support via 1:1 interaction with staff  3. Encourage involvement in milieu/groups/activities  4. Monitor for social isolation  7/16/2025 0134 by Megha Blanca, RN  Outcome: Progressing     Problem: Behavior  Goal: Pt/Family maintain appropriate behavior and adhere to behavioral management agreement, if implemented  Description: INTERVENTIONS:  1. Assess patient/family's coping skills and  non-compliant behavior (including use of illegal substances)  2. Notify security of behavior or suspected illegal substances which indicate the need for search of the family and/or belongings  3. Encourage verbalization of thoughts and concerns in a socially appropriate manner  4. Utilize positive, consistent limit setting strategies supporting safety of patient, staff and others  5. Encourage participation in the decision making process about the

## 2025-07-16 NOTE — PLAN OF CARE
Problem: Self Harm/Suicidality  Goal: Will have no self-injury during hospital stay  Description: INTERVENTIONS:  1.  Ensure constant observer at bedside with Q15M safety checks  2.  Maintain a safe environment  3.  Secure patient belongings  4.  Ensure family/visitors adhere to safety recommendations  5.  Ensure safety tray has been added to patient's diet order  6.  Every shift and PRN: Re-assess suicidal risk via Frequent Screener    Outcome: Progressing  Flowsheets (Taken 7/15/2025 1141 by Savanna Allen, RN)  Will have no self-injury during hospital stay: Maintain a safe environment     Problem: Risk for Elopement  Goal: Patient will not exit the unit/facility without proper excort  Outcome: Progressing  Flowsheets (Taken 7/15/2025 1141 by Savanna Allen, RN)  Nursing Interventions for Elopement Risk: Reduce environmental triggers     Problem: Depression  Goal: Will be euthymic at discharge  Description: INTERVENTIONS:  1. Administer medication as ordered  2. Provide emotional support via 1:1 interaction with staff  3. Encourage involvement in milieu/groups/activities  4. Monitor for social isolation  Outcome: Progressing     Problem: Behavior  Goal: Pt/Family maintain appropriate behavior and adhere to behavioral management agreement, if implemented  Description: INTERVENTIONS:  1. Assess patient/family's coping skills and  non-compliant behavior (including use of illegal substances)  2. Notify security of behavior or suspected illegal substances which indicate the need for search of the family and/or belongings  3. Encourage verbalization of thoughts and concerns in a socially appropriate manner  4. Utilize positive, consistent limit setting strategies supporting safety of patient, staff and others  5. Encourage participation in the decision making process about the behavioral management agreement  6. If a visitor's behavior poses a threat to safety call refer to organization policy.  7. Initiate consult

## 2025-07-16 NOTE — GROUP NOTE
Date: 7/16/2025    Group Start Time: 1210  Group End Time: 1300  Group Topic: Psychoeducation    [facility] [place] [facility] Pia Olivo    Cognitive Distortions  Clarifying, Education, Exploration, Support    Patients will learn about common cognitive distortions (black-white thinking; magnification/minimization; jumping to conclusions; personalization; emotional reasoning; \"should\" statements) and describe how they might appear in/impact our everyday lives. Patients will explore ways they can challenge/cope with cognitive distortions.      Focus: Challenging Negative Self-Talk  Goals: Improve Cognitive Skills, Improve Self-Awareness, Improve Self-Esteem, Improve Self-Expression, Improve Coping Skills, Promote Reality Orientation     Patient engaged in peer conversations about cognitive distortions and coping strategies to manage them. Patient shared examples of how cognitive distortions may manifest in our lives, and provided \"evidence\" for and against certain thoughts. Patient verbalized support for peers' contributions and expressed that he found the information to be beneficial.     Attended: 3/4-full attendance  Participation Level: Active Listener and Interactive  Participation Quality: Attentive, Sharing, and Supportive  Affect/Mood: Congruent/Euthymic  Speech: spontaneous, normal rate, and normal volume   Thought Content/Processes: Linear  Level of consciousness: Alert  Response: Able to verbalize current knowledge/experience and Able to verbalize/acknowledge new learning  Outcomes: Successfully internalized the purpose of intervention and Actively participated in the group experience    Signature: Pia Rasheed, Licensed Professional Music Therapist (LPMT)

## 2025-07-16 NOTE — GROUP NOTE
Date: 7/16/2025    Group Start Time: 0910  Group End Time: 0951  Group Topic: Music Therapy    [facility] 3W Pia Olivo    Emotion Identification Through Classical Music Listening  Music Analysis/Discussion, Receptive Music Listening    Patients will listen to a variety of classical piece and describe what they hear in the music in terms of which emotion(s) they think is/are represented, what scene in a movie/TV show it may be representing, and/or if any personal connections to the music emerged. Patients will reflect on how emotional awareness and identification may be influential in their lives.    Pieces used:  \"New World Symphony, IV. Allegro con arnav\" by Gibran Byrne  \"The Entertainer\" by Mikey Dumont  \"Symphony No.3 in F Major, Op. 90, III. Poco allegretto\" by Chinedu Bueno  \"Danse Macabre\" by Camille Saint-Saens  \"Flight of the Bumblebee\" by Mikael  \"Merry-Go-Round of Life\" from [employer] [place]  \"Night on [place] Mountain\" by Alfreda Tolliver  \"Rage Over a Lost Elena\" by Sergei Paulino  \"Act II. No. 10, Moderato\" from [place] by Quentin Chawla  \"Adagio for Strings, Op.11\" by Nithin Wilkinson  \"The [place]\" by Camille Saint-Saens      Focus: Creativity; Emotion Awareness, Identification, & Regulation  Goals: Improve Attention to Task, Improve Cognitive Skills, Improve Self-Expression, Improve Coping Skills, Promote Reality Orientation     Patient listened to recorded classical music and engaged in peer discussions about emotions and personal connections to the music. Patient shared relevant memories from his life as related to the classical pieces played, and was receptive to peer/staff positive feedback. Patient mostly kept to himself, but was pleasant and friendly when interacting with others.    Attended: Full attendance  Participation Level: Active Listener  Participation Quality: Attentive, Sharing, and Supportive  Affect/Mood: Congruent/Euthymic  Speech: normal rate and normal

## 2025-07-16 NOTE — GROUP NOTE
Group Therapy Note    Date: 7/16/2025    Group Start Time: 1010  Group End Time: 1100  Group Topic: Art Therapy     [facility] 3W Susan Alejandre, TIFFANIW        Group Therapy Note    Attendees: 9       Patient's Goal:  To participate in morning group art therapy.     Notes:  Patient did not attend.     Modes of Intervention: Activity      Discipline Responsible: Psychoeducational Specialist      Signature:  Susan Cruz [facility]

## 2025-07-17 LAB
C TRACH DNA UR QL NAA+PROBE: POSITIVE
N GONORRHOEA DNA UR QL NAA+PROBE: NEGATIVE

## 2025-07-17 PROCEDURE — 1240000000 HC EMOTIONAL WELLNESS R&B

## 2025-07-17 PROCEDURE — 6370000000 HC RX 637 (ALT 250 FOR IP): Performed by: PSYCHIATRY & NEUROLOGY

## 2025-07-17 RX ADMIN — SERTRALINE 50 MG: 50 TABLET, FILM COATED ORAL at 08:40

## 2025-07-17 RX ADMIN — TRAZODONE HYDROCHLORIDE 50 MG: 50 TABLET ORAL at 20:55

## 2025-07-17 NOTE — GROUP NOTE
Group Therapy Note    Date: 7/17/2025    Group Start Time: 1500  Group End Time: 1600  Group Topic: Healthy Living/Wellness    [facility] 3W Savanna Hilario RN        Group Therapy Note    Attendees: 11/22       Patient's Goal:  Learning about community resources including the benefits of [employer] with [employer].        Status After Intervention:  Unchanged    Participation Level: Interactive    Participation Quality: Appropriate      Speech:  normal      Thought Process/Content: Logical      Affective Functioning: Congruent      Mood: anxious      Level of consciousness:  Alert      Response to Learning: Able to verbalize current knowledge/experience      Endings: None Reported    Modes of Intervention: Education, Support, and Socialization      Discipline Responsible: Registered Nurse      Signature:  Savanna Allen RN

## 2025-07-17 NOTE — PLAN OF CARE
Problem: Self Harm/Suicidality  Goal: Will have no self-injury during hospital stay  Description: INTERVENTIONS:  1.  Ensure constant observer at bedside with Q15M safety checks  2.  Maintain a safe environment  3.  Secure patient belongings  4.  Ensure family/visitors adhere to safety recommendations  5.  Ensure safety tray has been added to patient's diet order  6.  Every shift and PRN: Re-assess suicidal risk via Frequent Screener    Outcome: Progressing  Flowsheets (Taken 7/16/2025 1544 by Jung Parish, RN)  Will have no self-injury during hospital stay:   Maintain a safe environment   Every shift and PRN: Re-assess suicidal risk via Frequent Screener     Problem: Risk for Elopement  Goal: Patient will not exit the unit/facility without proper excort  Outcome: Progressing  Flowsheets (Taken 7/16/2025 1544 by Jung Parish, RN)  Nursing Interventions for Elopement Risk: Reduce environmental triggers     Problem: Depression  Goal: Will be euthymic at discharge  Description: INTERVENTIONS:  1. Administer medication as ordered  2. Provide emotional support via 1:1 interaction with staff  3. Encourage involvement in milieu/groups/activities  4. Monitor for social isolation  Outcome: Progressing     Problem: Behavior  Goal: Pt/Family maintain appropriate behavior and adhere to behavioral management agreement, if implemented  Description: INTERVENTIONS:  1. Assess patient/family's coping skills and  non-compliant behavior (including use of illegal substances)  2. Notify security of behavior or suspected illegal substances which indicate the need for search of the family and/or belongings  3. Encourage verbalization of thoughts and concerns in a socially appropriate manner  4. Utilize positive, consistent limit setting strategies supporting safety of patient, staff and others  5. Encourage participation in the decision making process about the behavioral management agreement  6. If a visitor's behavior

## 2025-07-17 NOTE — PLAN OF CARE
Problem: Self Harm/Suicidality  Goal: Will have no self-injury during hospital stay  7/17/2025 1248 by Jocelyn Maharaj RN  Outcome: Progressing  7/17/2025 0001 by Edna Barry RN  Outcome: Progressing     Problem: Risk for Elopement  Goal: Patient will not exit the unit/facility without proper excort  7/17/2025 1248 by Jocelyn Maharaj RN  Outcome: Progressing  7/17/2025 0001 by Edna Barry RN  Outcome: Progressing     Problem: Depression  Goal: Will be euthymic at discharge  7/17/2025 1248 by Jocelyn Maharaj RN  Outcome: Progressing  7/17/2025 0001 by Edna Barry RN  Outcome: Progressing     Problem: Behavior  Goal: Pt/Family maintain appropriate behavior and adhere to behavioral management agreement, if implemented  7/17/2025 1248 by Jocelyn Maharaj RN  Outcome: Progressing  7/17/2025 0001 by Edna Barry RN  Outcome: Progressing     Problem: Anxiety  Goal: Will report anxiety at manageable levels  7/17/2025 1248 by Jocelyn Maharaj RN  Outcome: Progressing  7/17/2025 0001 by Edna Barry RN  Outcome: Progressing  Reviewed       Problem: Drug Abuse/Detox  Goal: Will have no detox symptoms and will verbalize plan for changing drug-related behavior  7/17/2025 1248 by Jocelyn Maharaj RN  Outcome: Progressing  7/17/2025 0001 by Edna Barry RN  Outcome: Progressing     Problem: Sleep Disturbance  Goal: Will exhibit normal sleeping pattern  7/17/2025 1248 by Jocelyn Maharaj RN  Outcome: Progressing  7/17/2025 0001 by Edna Barry RN  Outcome: Progressing     Problem: Involuntary Admit  Goal: Will cooperate with staff recommendations and doctor's orders and will demonstrate appropriate behavior  7/17/2025 1248 by Jocelyn Maharaj RN  Outcome: Progressing  7/17/2025 0001 by Edna Barry RN  Outcome: Progressing

## 2025-07-17 NOTE — GROUP NOTE
Group Therapy Note    Date: 7/17/2025    Group Start Time: 1330  Group End Time: 1400  Group Topic: Activity    [facility] 3W Mar Winters        Group Therapy Note    Attendees: 10       Patient's Goal:  To attend group    Notes:  Pt was attentive     Status After Intervention:  Unchanged    Participation Level: Interactive    Participation Quality: Appropriate      Speech:  normal      Thought Process/Content: Logical      Affective Functioning: Congruent      Mood: euthymic      Level of consciousness:  Alert      Response to Learning: Able to verbalize current knowledge/experience      Endings: None Reported    Modes of Intervention: Activity      Discipline Responsible: Behavorial Health Tech      Signature:  Mar Hartman

## 2025-07-17 NOTE — GROUP NOTE
Date: 7/17/2025    Group Start Time: 1020  Group End Time: 1055  Group Topic: Music Therapy    [facility] [place] 3W Pia Olivo    Live Music Song Choices  Live Music Listening, Re-creative Singing    Patients will be given a songbook and offered the opportunity to select (a) song(s) of their choice for live music listening on [employer]. Patients will be encouraged to sing along, move along, and listen to the music.    Focus: Building Positive Experiences, Relaxation  Goals: Increase Socialization, Improve Mood, Improve Self-Esteem, Improve Coping Skills, Promote Reality Orientation     Patient selected songs for live music listening and sang along to familiar music. Patient sang confidently during his selection. Patient mostly kept to himself but was pleasant and friendly upon approach. Patient expressed enjoyment of the experience.      Attended: 3/4-full attendance  Participation Level: Active Listener and Interactive  Participation Quality: Attentive, Sharing, and Supportive  Affect/Mood: Congruent/Euthymic  Speech: spontaneous, normal rate, and normal volume   Thought Content/Processes: Linear  Level of consciousness: Alert  Response: Able to verbalize current knowledge/experience  Outcomes: Successfully internalized the purpose of intervention and Actively participated in the group experience    Signature: Pia Rasheed, Licensed Professional Music Therapist (LPMT)

## 2025-07-17 NOTE — GROUP NOTE
Date: 7/17/2025    Group Start Time: 0912  Group End Time: 0950  Group Topic: Community Meeting    [facility] [place] 3[facility] Pia Olivo    Locus of Control  Nata Analysis/Discussion, Receptive Music Listening    Patients will listen to \"Drive\" by [employer]ub[employer] and identify lyrics/themes/interpretations derived from the song. Patients will explore the topics of things that are in their control and out of their control. Patients will be encouraged to share with the group.    Focus: Empowerment  Goals: Improve Cognitive Skills, Improve Self-Awareness, Increase Socialization, Improve Self-Esteem, Improve Self-Expression, Improve Coping Skills     Patient listened to recorded music and peer song discussions. Patient mostly kept to himself, but was pleasant and attentive when present. Patient nodded in response to peers' contributions. Patient left early, and later explained that he felt uncomfortable with having too many people sit near him.     Attended: 1/2-3/4 attendance  Participation Level: Active Listener and Interactive  Participation Quality: Attentive  Affect/Mood: Congruent/Euthymic  Speech: normal rate and normal volume   Thought Content/Processes: Linear  Level of consciousness: Alert  Response: Able to verbalize current knowledge/experience and Able to change behavior  Outcomes: Progressing towards goal and Actively participated in the group experience    Signature: Pia Rasheed, Licensed Professional Music Therapist (LPMT)

## 2025-07-18 VITALS
DIASTOLIC BLOOD PRESSURE: 87 MMHG | BODY MASS INDEX: 24.79 KG/M2 | RESPIRATION RATE: 18 BRPM | SYSTOLIC BLOOD PRESSURE: 130 MMHG | OXYGEN SATURATION: 99 % | HEART RATE: 69 BPM | HEIGHT: 65 IN | TEMPERATURE: 98.2 F

## 2025-07-18 PROCEDURE — 6370000000 HC RX 637 (ALT 250 FOR IP): Performed by: PSYCHIATRY & NEUROLOGY

## 2025-07-18 RX ORDER — TRAZODONE HYDROCHLORIDE 50 MG/1
50 TABLET ORAL NIGHTLY
Qty: 15 TABLET | Refills: 2 | Status: SHIPPED | OUTPATIENT
Start: 2025-07-18

## 2025-07-18 RX ORDER — METRONIDAZOLE 500 MG/1
500 TABLET ORAL EVERY 8 HOURS SCHEDULED
Status: DISCONTINUED | OUTPATIENT
Start: 2025-07-18 | End: 2025-07-18 | Stop reason: HOSPADM

## 2025-07-18 RX ORDER — METRONIDAZOLE 500 MG/1
500 TABLET ORAL EVERY 8 HOURS SCHEDULED
Qty: 7 TABLET | Refills: 0 | Status: SHIPPED | OUTPATIENT
Start: 2025-07-18 | End: 2025-07-21

## 2025-07-18 RX ADMIN — ACETAMINOPHEN 650 MG: 325 TABLET ORAL at 08:00

## 2025-07-18 RX ADMIN — SERTRALINE 50 MG: 50 TABLET, FILM COATED ORAL at 08:00

## 2025-07-18 ASSESSMENT — PAIN SCALES - GENERAL: PAINLEVEL_OUTOF10: 4

## 2025-07-18 ASSESSMENT — PAIN DESCRIPTION - LOCATION: LOCATION: BACK

## 2025-07-18 ASSESSMENT — PAIN DESCRIPTION - DESCRIPTORS: DESCRIPTORS: ACHING

## 2025-07-18 NOTE — GROUP NOTE
Date: 7/18/2025    Group Start Time: 0920  Group End Time: 1050  Group Topic: Music Therapy    [facility] [place] 3W Pia Olivo    An Album of Me  Playlist Building/Song Share, Receptive Music Listening    Patients will create an \"Album of Me\" where they identify songs relating to various prompts (song from my childhood; song for where I am now; song for the future; song to represent how I feel today; song to represent how I want to feel today; song that I never get sick of hearing), and give their album a personalized title. Patients will select a song to share with the group, and will be encouraged to explain their connection to it.    Focus: Emotion Regulation, Identity Development, Self-Reflection  Goals: Improve Self-Awareness, Improve Mood, Improve Self-Esteem, Improve Self-Expression, Improve Coping Skills    Patient independently completed his personal playlist. Patient selected to hear a song that I never get sick of hearing, and hummed to the music. Patient explained that the song \"describes my life,\" and was receptive to positive feedback from peers/staff. Patient was in/out of group r/t anticipated discharge today.    Attended: 1/2-3/4 attendance  Participation Level: Active Listener and Interactive  Participation Quality: Appropriate  Affect/Mood: Congruent/Euthymic  Speech: normal rate and normal volume   Thought Content/Processes: Linear  Level of consciousness: Alert  Response: Able to verbalize current knowledge/experience  Outcomes: Actively participated in the group experience and Anticipated discharge today    Signature: Pia Rasheed, Licensed Professional Music Therapist (LPMT)

## 2025-07-18 NOTE — CARE COORDINATION
FAMILY COLLATERAL NOTE    Family/Support Name: Evan Salazar   Contact #:[phone]   Relationship to Pt:: Step mother    Family/Support contact aware of hospitalization: Yes    Presenting Symptoms/Current Concerns:  Depression and anxiety  Overwhelming thoughts    Top 3 Life Stressors:   Conflictual relationship with his sister  Work issues    Background History Relevant to Current Hospitalization:  No prior inpatient    Family Mental Health/Substance Use History:   Family hx of mental health    Support Network's Goal for Hospitalization:     Discharge Plan:   Return home with above    Support Network Supportive of Discharge Plan:   Yes    Support can confirm Safety of Location and Security of Weapons:   No    Support agreeable to Safeguard and Monitor Medications (including Prescription and OTC):   Yes    Identified Barriers to Compliance with Discharge Plan:   None   Recommendations for Support Network:   Available for follow up calls.      LOIDA Thakkar

## 2025-07-18 NOTE — PLAN OF CARE
Patient is in and out of his room. Flat affect. Cooperative with his assessment. Denies both anxiety and depression. Denies SI/HI and AVH. Denies further needs at this time.   Problem: Self Harm/Suicidality  Goal: Will have no self-injury during hospital stay  Description: INTERVENTIONS:  1.  Ensure constant observer at bedside with Q15M safety checks  2.  Maintain a safe environment  3.  Secure patient belongings  4.  Ensure family/visitors adhere to safety recommendations  5.  Ensure safety tray has been added to patient's diet order  6.  Every shift and PRN: Re-assess suicidal risk via Frequent Screener    7/17/2025 2012 by Edna Barry, RN  Outcome: Progressing  Flowsheets (Taken 7/17/2025 1249 by Jocelyn Maharaj, RN)  Will have no self-injury during hospital stay: Maintain a safe environment  7/17/2025 1248 by Jocelyn Maharaj, RN  Outcome: Progressing     Problem: Risk for Elopement  Goal: Patient will not exit the unit/facility without proper excort  7/17/2025 2012 by Edna Barry, RN  Outcome: Progressing  Flowsheets (Taken 7/17/2025 1249 by Jocelyn Maharaj, RN)  Nursing Interventions for Elopement Risk: Reduce environmental triggers  7/17/2025 1248 by Jocelyn Maharaj, RN  Outcome: Progressing     Problem: Depression  Goal: Will be euthymic at discharge  Description: INTERVENTIONS:  1. Administer medication as ordered  2. Provide emotional support via 1:1 interaction with staff  3. Encourage involvement in milieu/groups/activities  4. Monitor for social isolation  7/17/2025 2012 by Edna Barry, RN  Outcome: Progressing  7/17/2025 1248 by Jocelyn Maharaj, RN  Outcome: Progressing     Problem: Behavior  Goal: Pt/Family maintain appropriate behavior and adhere to behavioral management agreement, if implemented  Description: INTERVENTIONS:  1. Assess patient/family's coping skills and  non-compliant behavior (including use of illegal substances)  2. Notify security of behavior or suspected illegal substances

## 2025-07-18 NOTE — DISCHARGE INSTRUCTIONS
Someone from [facility] will be calling you tomorrow to follow up on your care. If you don't hear from us, give us a call! [phone].    Keep all follow up appointments, take medications as ordered, utilize positive supports, abstain from use of alcohol and drugs. If symptoms return or you feel at risk to yourself or others, please call 911, return the nearest emergency room, or call your local crisis hotline:  [facility]: [phone]  [facility]: [phone]  [facility]: [phone]     For assistance with quitting smoking please go to https://smokefree.gov. A prescription for an FDA-approved tobacco cessation medication was offered at discharge and the patient refused.

## 2025-07-18 NOTE — DISCHARGE SUMMARY
prior to the discharge.         Medication List        START taking these medications      traZODone 50 MG tablet  Commonly known as: DESYREL  Take 1 tablet by mouth nightly            CHANGE how you take these medications      albuterol sulfate  (90 Base) MCG/ACT inhaler  Commonly known as: PROVENTIL;VENTOLIN;PROAIR  TAKE 2 PUFFS BY MOUTH EVERY 6 HOURS AS NEEDED FOR WHEEZE  What changed: Another medication with the same name was removed. Continue taking this medication, and follow the directions you see here.            CONTINUE taking these medications      sertraline 50 MG tablet  Commonly known as: ZOLOFT  Take 1 tablet by mouth daily            STOP taking these medications      beclomethasone 40 MCG/ACT inhaler  Commonly known as: QVAR     cloNIDine 0.2 MG tablet  Commonly known as: CATAPRES     ibuprofen 800 MG tablet  Commonly known as: ADVIL;MOTRIN               Where to Get Your Medications        These medications were sent to [facility] - [place], [place] - [address] - P [phone] - F [phone]  [address] [place] [address] [place] [address]      Phone: [phone]   sertraline 50 MG tablet  traZODone 50 MG tablet           Reason for more than one antipsychotic:   [x] N/A  [] 3 failed monotherapy(drugs tried):  [] Cross over to a new antipsychotic  [] Taper to monotherapy from polypharmacy  [] Augmentation of Clozapine therapy due to treatment resistance to single therapy        TIME SPEND - 35 MINUTES TO COMPLETE THE EVALUATION, DISCHARGE SUMMARY, MEDICATION RECONCILIATION AND FOLLOW UP CARE     Signed:  JACQUELINE AKBAR MD  7/18/2025  9:44 AM

## 2025-07-18 NOTE — TRANSITION OF CARE
Behavioral Health Transition Record    Patient Name: Mike Parsons  YOB: 2005   Medical Record Number: [id number]  Date of Admission: 7/14/2025  2:02 AM   Date of Discharge: 07/18/2025    Attending Provider: Lake Ruffin MD   Discharging Provider: Dr. Ruffin  To contact this individual call [phone] and ask the  to page.  If unavailable, ask to be transferred to Behavioral Health Provider on call.  A Behavioral Health Provider will be available on call 24/7 and during holidays.    Primary Care Provider: Ankush Dueñas MD    No Known Allergies    Reason for Admission:   CIRCUMSTANCES OF ADMISSION:      Patient is a 20 y.o.  male who presents for psychiatric evaluation. Patient presented to the ED on 07/14/25 from police.  Per ED documentation: \"Pt arrived to triage via EMS. Pt c/o suicidal ideations.Pt states he had a plan to jump [place] but decided to call 911 instead. Pt calm and cooperative in triage.\"     Patient reports presenting to the ED due to having been having a lot of racing thoughts and looking back at his life and feeling as if things are the way that they were supposed to.      Patient denies following with any outpatient psychiatric provider or therapist.  He states that he had been prescribed Zoloft however has not taken it in about a month.  He does share that he feels as if he had improved mood while on it and had just forgotten to pick it up but had been on it for approximately 6 months prior.  He states that ultimately he would like to be happy and more and not feel as if he constantly has to take a mental reset throughout the day.  Patient identifies his niece and nephew as protective factors as well as having interest and desire to do better in his life.  However he does endorse often feelings of hopelessness as if things would not get better and it is constantly doing the same thing daily with no change.  He reports impact and

## 2025-07-18 NOTE — GROUP NOTE
Date: 7/18/2025    Group Start Time: 1210  Group End Time: 1255  Group Topic: Music Therapy    [facility] [place] [facility] [place]    Nata Collage  Art Component/Reflection, Composition/Songwriting, Receptive Music Listening    Patients will choose from a variety of pre-selected song lyrics to arrange into a collage representing where they are at today / who they are. Patients will be encouraged to add color and additional words to their collage if they so choose. Patients will listen to an LPMT-curated playlist comprised of the songs where the pre-selected lyrics originate.    Focus: Creativity, Identity Development, Self-Esteem Building  Goals: Improve Attention to Task, Improve Self-Awareness, Increase Socialization, Improve Mood, Improve Self-Expression, Improve Coping Skills, Increase Sensory Stimulation     Patient declined to create a nata collage and opted to listen to recorded music instead. Patient was in/out of group r/t anticipated discharge today.     Attended: 1/4-1/2 attendance  Participation Level: Active Listener  Participation Quality: Appropriate  Affect/Mood: Congruent/Euthymic  Speech: normal rate and normal volume   Thought Content/Processes: Linear  Level of consciousness: Alert  Response: Able to retain information  Outcomes: Anticipated discharge today    Signature: Pia Rasheed, Licensed Professional Music Therapist (LPMT)

## 2025-07-18 NOTE — PROGRESS NOTES
[facility]  BEHAVIORAL HEALTH FOLLOW-UP NOTE       7/17/2025     Patient was seen and examined in person, Chart reviewed   Patient's case discussed with staff/team    Chief Complaint: depression SI    Interim History:   Pt had a mini panic attack this morning during groups when people sat close to him  Pt had to leave the group  Less depressed  No active SI or HI  Feel anxious locked up in here  Sleeping better with trazodone    Appetite:   [] Normal/Unchanged  [] Increased  [x] Decreased      Sleep:       [] Normal/Unchanged  [x] Fair       [] Poor              Energy:    [x] Normal/Unchanged  [] Increased  [] Decreased        SI [] Present  [x] Absent    HI  []Present  [x] Absent     Aggression:  [] yes  [x] no    Patient is [x] able  [] unable to CONTRACT FOR SAFETY     PAST MEDICAL/PSYCHIATRIC HISTORY:   Past Medical History:   Diagnosis Date    Asthma     Attention deficit disorder (ADD)     [place]    Autism spectrum disorder        FAMILY/SOCIAL HISTORY:  No family history on file.  Social History     Socioeconomic History    Marital status: Single     Spouse name: Not on file    Number of children: Not on file    Years of education: Not on file    Highest education level: Not on file   Occupational History    Not on file   Tobacco Use    Smoking status: Never    Smokeless tobacco: Never    Tobacco comments:     smoking household   Substance and Sexual Activity    Alcohol use: Not on file    Drug use: Not on file    Sexual activity: Not on file   Other Topics Concern    Not on file   Social History Narrative    Not on file     Social Drivers of Health     Financial Resource Strain: Low Risk  (9/16/2024)    Overall Financial Resource Strain (CARDIA)     Difficulty of Paying Living Expenses: Not hard at all   Food Insecurity: No Food Insecurity (7/14/2025)    Hunger Vital Sign     Worried About Running Out of Food in the Last Year: Never true     Ran Out of Food in the Last Year: 
      Behavioral Services  Medicare Certification Upon Admission    I certify that this patient's inpatient psychiatric hospital admission is medically necessary for:    [x] (1) Treatment which could reasonably be expected to improve this patient's condition,       [x] (2) Or for diagnostic study;     AND     [x](2) The inpatient psychiatric services are provided while the individual is under the care of a physician and are included in the individualized plan of care.    Estimated length of stay/service 3-5    Plan for post-hospital care OP    Electronically signed by JACQUELINE AKBAR MD on 7/15/2025 at 10:37 AM      
  Pt out on unit, but not social with peers, brightened appropriate affect. Pt voiced goal, \"go to groups.\" Pt observed sitting in group therapy.Pt reports showering today.Pt reports good appetite.  Pt reports good sleep.Pt rates anxiety 0/10.Pt rates depression 0/10. On a scale from 1 through 10, 10 being the highest. Pt denies SI, HI and A/V hallucinations. Will continue to monitor.    
  Pt out on unit, but not social with peers, flat blunt affect. Pt voiced goal, \"read a book.\" Pt denies racing thoughts. Pt voiced medication regime effective.Pt reports showering today.Pt reports good appetite.Pt reports good sleep. Pt denies anxiety, depression.Pt reports attending groups.Pt denies SI, HI and A/V hallucinations.Will continue to monitor.    
  Pt out on unit, but not social with peers, flat blunt affect. Pt voiced increased racing thoughts, not sleeping at night. Pt reports showering today. Pt voiced goal, \"feel better.\" Pt reports poor appetite.Pt reports poor sleep, due to trouble falling asleep, staying asleep. Pt rates anxiety 4/10.Pt rates depression 7/10. On a scale from 1 through 10, 10 being the highest. Pt reports attending groups.Pt denies SI, HI and A/V hallucinations. Will continue to monitor.    
CLINICAL PHARMACY NOTE: MEDS TO BEDS    Total # of Prescriptions Filled: 3   The following medications were delivered to the patient:  Trazodone 50 mg Tab  Sertraline 50 mg Tab  Metronidazole 500 mg Tab    Additional Documentation:    
Patient to 3W via wheelchair. Oriented to unit. Skin check completed, skin intact but has multiple mosquito bites to bilateral ankles. Negative for contraband at this time.   
Progress Note    Date:7/18/2025       [facility]  Patient Name:Mike Parsons     YOB: 2005     Age:20 y.o.    Assessment        Hospital Problems           Last Modified POA    * (Principal) Severe episode of recurrent major depressive disorder, without psychotic features ([facility]) 7/15/2025 Yes       Plan:      *Depression with suicidal ideation  - Psychiatry managing     *Asthma  - Albuterol inhaler every 4 hours as needed     * ADD, autism  -Psychiatry managing     *GERD  - Maalox as needed     *Tobacco use  Commit lozenges every 2 hours as needed    *Trichomonas  - Flagyl 500 twice daily x 7 days; patient instructed to complete medication as instructed and side effects of medication, safe sex practices    Additional work up or/and treatment plan may be added today or then after based on clinical progression by other providers or specialists.  Patient will need to follow-up with PCP for chronic disease management.     I have spent greater than 70% of time  spent focused exclusively on this patient ,reviewing  chart, labs/diagnostics, reconciling medications, &  answering questions with patient and discussing plan.  Subjective   Interval History Status: Patient's lab results back.  Patient positive for trichomonas.  Will prescribe patient Flagyl 500 mg twice daily x 7 days.  Patient educated on safe sex practices.  Patient instructed to complete medication as instructed and side effects of medications.  Patient acknowledges understanding.  Patient to discharge today        Review of Systems   12 point review of systems reviewed with patient; negative other than as mentioned    Medications   Scheduled Meds:    metroNIDAZOLE  500 mg Oral 3 times per day    traZODone  50 mg Oral Nightly    sertraline  50 mg Oral Daily     Continuous Infusions:   PRN Meds: albuterol sulfate HFA, haloperidol **OR** haloperidol lactate, hydrOXYzine pamoate **OR** hydrOXYzine, acetaminophen, magnesium hydroxide, 
Pt reports depression level is #4/10, anxiety level is #5/10. Pt denies SI/HI/AVH. Pt is isolative to room,  
Pt reports no depression or anxiety, denies SI/HI/AVH. Pt reports attending most groups today, eats all meals in DR, otherwise keeps to self in room.Pt observed reading in room.   
Pt requesting PRN tylenol for 4/10 back pain. Administered at this time without difficulty.   
Pt signed voluntary.  Pt wants to stay in his room where he feels safe.  Pt states that the floor is very loud and is overstimulating and some of the patients are intimidating.  
discussed.  Collateral information:   CD evaluation  Encourage patient to attend group and other milieu activities.  Discharge planning discussed with the patient and treatment team.    PSYCHOTHERAPY/COUNSELING:  [x] Therapeutic interview  [x] Supportive  [] CBT  [] Ongoing  [] Other    [x] Patient continues to need, on a daily basis, active treatment furnished directly by or requiring the supervision of inpatient psychiatric personnel      Anticipated Length of stay:        Electronically signed by JACQUELINE AKBAR MD on 7/16/2025 at 10:09 AM

## 2025-07-18 NOTE — DISCHARGE INSTR - DIET

## 2025-07-19 ASSESSMENT — ENCOUNTER SYMPTOMS
ABDOMINAL PAIN: 0
SHORTNESS OF BREATH: 0